# Patient Record
Sex: MALE | Race: WHITE | NOT HISPANIC OR LATINO | Employment: FULL TIME | URBAN - METROPOLITAN AREA
[De-identification: names, ages, dates, MRNs, and addresses within clinical notes are randomized per-mention and may not be internally consistent; named-entity substitution may affect disease eponyms.]

---

## 2023-10-31 NOTE — PROGRESS NOTES
Subjective:       Patient ID: Tanya Garcia is a 43 y.o. male.    Chief Complaint: Establish Care and Fatigue      HPI   This is a 43-year-old white male who presents to clinic today to establish care.  Patient has no significant past medical history and takes no daily medications.  Reports that he is overall pretty active.  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  Has been feeling like he has less stamina and strength then he used to for awhile now.  Started lifting weights couple of weeks ago with some of his coworkers to see if this would maybe help.  Review of Systems  Comprehensive review of systems negative except as stated in HPI    The patient's Health Maintenance was reviewed and the following appears to be due:   Health Maintenance Due   Topic Date Due    Hepatitis C Screening  Never done    Lipid Panel  Never done    HIV Screening  Never done    Hemoglobin A1c (Diabetic Prevention Screening)  Never done    Influenza Vaccine (1) Never done       Past Medical History:  Past Medical History:   Diagnosis Date    Nasal polyps     Unilateral inguinal hernia, without obstruction or gangrene, not specified as recurrent      Past Surgical History:   Procedure Laterality Date    CYST REMOVAL      INGUINAL HERNIA REPAIR       Review of patient's allergies indicates:  No Known Allergies  Current Outpatient Medications on File Prior to Visit   Medication Sig Dispense Refill    calcium-vitamin D3 (CALCIUM 500 + D) 500 mg-5 mcg (200 unit) per tablet Take 1 tablet by mouth 2 (two) times daily with meals.      fluticasone propionate (FLONASE) 50 mcg/actuation nasal spray 1 spray by Each Nostril route once daily.       No current facility-administered medications on file prior to visit.     Social History     Socioeconomic History    Marital status:    Tobacco Use    Smoking status: Former     Types: Cigarettes     Start date: 2008     Quit date: 1993     Years since quitting:  "30.8    Smokeless tobacco: Former     Types: Chew   Substance and Sexual Activity    Alcohol use: Not Currently     Comment: couple beers daily when home    Drug use: Never    Sexual activity: Yes     Partners: Female     Family History   Problem Relation Age of Onset    Cancer Mother        Objective:       BP (!) 128/96 (BP Location: Right arm)   Pulse 84   Temp 98.3 °F (36.8 °C) (Oral)   Resp 16   Ht 5' 9" (1.753 m)   Wt 93.2 kg (205 lb 6.4 oz)   SpO2 96%   BMI 30.33 kg/m²      Physical Exam  Vitals and nursing note reviewed.   Constitutional:       Appearance: Normal appearance.      Comments: Overweight   HENT:      Head: Normocephalic and atraumatic.      Right Ear: Tympanic membrane, ear canal and external ear normal.      Left Ear: Tympanic membrane, ear canal and external ear normal.      Nose: Nose normal.      Mouth/Throat:      Mouth: Mucous membranes are moist.      Pharynx: Oropharynx is clear.   Eyes:      Extraocular Movements: Extraocular movements intact.      Conjunctiva/sclera: Conjunctivae normal.      Pupils: Pupils are equal, round, and reactive to light.   Cardiovascular:      Rate and Rhythm: Normal rate and regular rhythm.      Pulses: Normal pulses.      Heart sounds: Normal heart sounds.   Pulmonary:      Effort: Pulmonary effort is normal.      Breath sounds: Normal breath sounds.   Musculoskeletal:         General: Normal range of motion.      Cervical back: Normal range of motion and neck supple.   Skin:     General: Skin is warm and dry.   Neurological:      General: No focal deficit present.      Mental Status: He is alert and oriented to person, place, and time.   Psychiatric:         Mood and Affect: Mood normal.         Behavior: Behavior normal.         Thought Content: Thought content normal.         Judgment: Judgment normal.         Labs  No visits with results within 6 Month(s) from this visit.   Latest known visit with results is:   No results found for any previous " visit.       Assessment and Plan       ICD-10-CM ICD-9-CM   1. Encounter to establish care  Z76.89 V65.8   2. Elevated blood pressure reading  R03.0 796.2   3. Fatigue, unspecified type  R53.83 780.79   4. Need for hepatitis C screening test  Z11.59 V73.89   5. Screening for HIV (human immunodeficiency virus)  Z11.4 V73.89   6. Annual physical exam  Z00.00 V70.0   7. Low libido  R68.82 799.81        1. Encounter to establish care    2. Elevated blood pressure reading  Assessment & Plan:  Elevated x2 today in clinic.  Instructed to get a home blood pressure cuff and start monitoring daily blood pressures and recording.  Bring home blood pressure log with him to follow-up wellness in approximately 2 weeks.      3. Fatigue, unspecified type  Assessment & Plan:  Will get wellness labs including testosterone, return to clinic in 2 weeks for results/annual exam.    Orders:  -     Testosterone; Future; Expected date: 11/01/2023    4. Need for hepatitis C screening test  -     Hepatitis C Antibody; Future; Expected date: 11/01/2023    5. Screening for HIV (human immunodeficiency virus)  -     HIV 1/2 Ag/Ab (4th Gen); Future; Expected date: 11/01/2023    6. Annual physical exam  -     CBC Auto Differential; Future; Expected date: 11/01/2023  -     Comprehensive Metabolic Panel; Future; Expected date: 11/01/2023  -     Lipid Panel; Future; Expected date: 11/01/2023  -     TSH; Future; Expected date: 11/01/2023  -     Hemoglobin A1C; Future; Expected date: 11/01/2023    7. Low libido  -     Testosterone; Future; Expected date: 11/01/2023           Follow up in about 13 days (around 11/14/2023).

## 2023-11-01 ENCOUNTER — OFFICE VISIT (OUTPATIENT)
Dept: FAMILY MEDICINE | Facility: CLINIC | Age: 43
End: 2023-11-01
Payer: COMMERCIAL

## 2023-11-01 VITALS
RESPIRATION RATE: 16 BRPM | HEART RATE: 84 BPM | DIASTOLIC BLOOD PRESSURE: 96 MMHG | BODY MASS INDEX: 30.42 KG/M2 | TEMPERATURE: 98 F | WEIGHT: 205.38 LBS | SYSTOLIC BLOOD PRESSURE: 128 MMHG | HEIGHT: 69 IN | OXYGEN SATURATION: 96 %

## 2023-11-01 DIAGNOSIS — R53.83 FATIGUE, UNSPECIFIED TYPE: ICD-10-CM

## 2023-11-01 DIAGNOSIS — Z76.89 ENCOUNTER TO ESTABLISH CARE: Primary | ICD-10-CM

## 2023-11-01 DIAGNOSIS — Z11.4 SCREENING FOR HIV (HUMAN IMMUNODEFICIENCY VIRUS): ICD-10-CM

## 2023-11-01 DIAGNOSIS — Z00.00 ANNUAL PHYSICAL EXAM: ICD-10-CM

## 2023-11-01 DIAGNOSIS — Z11.59 NEED FOR HEPATITIS C SCREENING TEST: ICD-10-CM

## 2023-11-01 DIAGNOSIS — R68.82 LOW LIBIDO: ICD-10-CM

## 2023-11-01 DIAGNOSIS — R03.0 ELEVATED BLOOD PRESSURE READING: ICD-10-CM

## 2023-11-01 PROCEDURE — 3008F BODY MASS INDEX DOCD: CPT | Mod: CPTII,,, | Performed by: NURSE PRACTITIONER

## 2023-11-01 PROCEDURE — 3008F PR BODY MASS INDEX (BMI) DOCUMENTED: ICD-10-PCS | Mod: CPTII,,, | Performed by: NURSE PRACTITIONER

## 2023-11-01 PROCEDURE — 1159F MED LIST DOCD IN RCRD: CPT | Mod: CPTII,,, | Performed by: NURSE PRACTITIONER

## 2023-11-01 PROCEDURE — 3080F DIAST BP >= 90 MM HG: CPT | Mod: CPTII,,, | Performed by: NURSE PRACTITIONER

## 2023-11-01 PROCEDURE — 99203 PR OFFICE/OUTPT VISIT, NEW, LEVL III, 30-44 MIN: ICD-10-PCS | Mod: ,,, | Performed by: NURSE PRACTITIONER

## 2023-11-01 PROCEDURE — 99203 OFFICE O/P NEW LOW 30 MIN: CPT | Mod: ,,, | Performed by: NURSE PRACTITIONER

## 2023-11-01 PROCEDURE — 3074F SYST BP LT 130 MM HG: CPT | Mod: CPTII,,, | Performed by: NURSE PRACTITIONER

## 2023-11-01 PROCEDURE — 1159F PR MEDICATION LIST DOCUMENTED IN MEDICAL RECORD: ICD-10-PCS | Mod: CPTII,,, | Performed by: NURSE PRACTITIONER

## 2023-11-01 PROCEDURE — 1160F RVW MEDS BY RX/DR IN RCRD: CPT | Mod: CPTII,,, | Performed by: NURSE PRACTITIONER

## 2023-11-01 PROCEDURE — 3080F PR MOST RECENT DIASTOLIC BLOOD PRESSURE >= 90 MM HG: ICD-10-PCS | Mod: CPTII,,, | Performed by: NURSE PRACTITIONER

## 2023-11-01 PROCEDURE — 3074F PR MOST RECENT SYSTOLIC BLOOD PRESSURE < 130 MM HG: ICD-10-PCS | Mod: CPTII,,, | Performed by: NURSE PRACTITIONER

## 2023-11-01 PROCEDURE — 1160F PR REVIEW ALL MEDS BY PRESCRIBER/CLIN PHARMACIST DOCUMENTED: ICD-10-PCS | Mod: CPTII,,, | Performed by: NURSE PRACTITIONER

## 2023-11-01 RX ORDER — FERROUS SULFATE, DRIED 160(50) MG
1 TABLET, EXTENDED RELEASE ORAL 2 TIMES DAILY WITH MEALS
COMMUNITY

## 2023-11-01 RX ORDER — FLUTICASONE PROPIONATE 50 MCG
1 SPRAY, SUSPENSION (ML) NASAL DAILY
COMMUNITY

## 2023-11-01 NOTE — ASSESSMENT & PLAN NOTE
Will get wellness labs including testosterone, return to clinic in 2 weeks for results/annual exam.

## 2023-11-01 NOTE — ASSESSMENT & PLAN NOTE
Elevated x2 today in clinic.  Instructed to get a home blood pressure cuff and start monitoring daily blood pressures and recording.  Bring home blood pressure log with him to follow-up wellness in approximately 2 weeks.

## 2023-11-03 LAB
ALBUMIN SERPL-MCNC: 4.9 G/DL (ref 4.1–5.1)
ALBUMIN/GLOB SERPL: 2.3 {RATIO} (ref 1.2–2.2)
ALP SERPL-CCNC: 53 IU/L (ref 44–121)
ALT SERPL-CCNC: 23 IU/L (ref 0–44)
AST SERPL-CCNC: 15 IU/L (ref 0–40)
BASOPHILS # BLD AUTO: 0 X10E3/UL (ref 0–0.2)
BASOPHILS NFR BLD AUTO: 0 %
BILIRUB SERPL-MCNC: 0.4 MG/DL (ref 0–1.2)
BUN SERPL-MCNC: 20 MG/DL (ref 6–24)
BUN/CREAT SERPL: 16 (ref 9–20)
CALCIUM SERPL-MCNC: 9.6 MG/DL (ref 8.7–10.2)
CHLORIDE SERPL-SCNC: 103 MMOL/L (ref 96–106)
CHOLEST SERPL-MCNC: 161 MG/DL (ref 100–199)
CO2 SERPL-SCNC: 24 MMOL/L (ref 20–29)
CREAT SERPL-MCNC: 1.24 MG/DL (ref 0.76–1.27)
EOSINOPHIL # BLD AUTO: 0.5 X10E3/UL (ref 0–0.4)
EOSINOPHIL NFR BLD AUTO: 8 %
ERYTHROCYTE [DISTWIDTH] IN BLOOD BY AUTOMATED COUNT: 11.9 % (ref 11.6–15.4)
EST. GFR  (NO RACE VARIABLE): 74 ML/MIN/1.73
GLOBULIN SER CALC-MCNC: 2.1 G/DL (ref 1.5–4.5)
GLUCOSE SERPL-MCNC: 92 MG/DL (ref 70–99)
HBA1C MFR BLD: 5.5 % (ref 4.8–5.6)
HCT VFR BLD AUTO: 44.6 % (ref 37.5–51)
HCV IGG SERPL QL IA: NON REACTIVE
HDLC SERPL-MCNC: 38 MG/DL
HGB BLD-MCNC: 14.8 G/DL (ref 13–17.7)
HIV 1+2 AB+HIV1 P24 AG SERPL QL IA: NON REACTIVE
IMM GRANULOCYTES NFR BLD AUTO: 0 %
LDLC SERPL CALC-MCNC: 100 MG/DL (ref 0–99)
LYMPHOCYTES # BLD AUTO: 2.1 X10E3/UL (ref 0.7–3.1)
LYMPHOCYTES NFR BLD AUTO: 36 %
MCH RBC QN AUTO: 30.6 PG (ref 26.6–33)
MCHC RBC AUTO-ENTMCNC: 33.2 G/DL (ref 31.5–35.7)
MCV RBC AUTO: 92 FL (ref 79–97)
MONOCYTES # BLD AUTO: 0.4 X10E3/UL (ref 0.1–0.9)
MONOCYTES NFR BLD AUTO: 7 %
NEUTROPHILS # BLD AUTO: 2.8 X10E3/UL (ref 1.4–7)
NEUTROPHILS NFR BLD AUTO: 49 %
PLATELET # BLD AUTO: 212 X10E3/UL (ref 150–450)
POTASSIUM SERPL-SCNC: 5 MMOL/L (ref 3.5–5.2)
PROT SERPL-MCNC: 7 G/DL (ref 6–8.5)
RBC # BLD AUTO: 4.84 X10E6/UL (ref 4.14–5.8)
SODIUM SERPL-SCNC: 143 MMOL/L (ref 134–144)
TESTOST SERPL-MCNC: 493 NG/DL (ref 264–916)
TRIGL SERPL-MCNC: 125 MG/DL (ref 0–149)
TSH SERPL DL<=0.005 MIU/L-ACNC: 1.83 UIU/ML (ref 0.45–4.5)
VLDLC SERPL CALC-MCNC: 23 MG/DL (ref 5–40)
WBC # BLD AUTO: 5.8 X10E3/UL (ref 3.4–10.8)

## 2023-11-16 ENCOUNTER — TELEPHONE (OUTPATIENT)
Dept: FAMILY MEDICINE | Facility: CLINIC | Age: 43
End: 2023-11-16

## 2023-11-16 PROBLEM — Z00.00 ANNUAL PHYSICAL EXAM: Status: ACTIVE | Noted: 2023-11-16

## 2023-11-16 NOTE — TELEPHONE ENCOUNTER
Spoke with the pt and informed him that there was some type of miscommunication with scheduling and the wellness visit is the one visit yearly that has to be done in the office. Apologized for the inconvenience. Pt states [place] will be [place] for the next 4 weeks so he is not sure when he will be able to come in for his visit. Will schedule through the patient portal when he knows he will be in.

## 2023-11-16 NOTE — TELEPHONE ENCOUNTER
----- Message from Kayla Dickey sent at 11/16/2023 12:10 PM CST -----  Regarding: med advice  .Type:  Needs Medical Advice    Who Called: Pt  Symptoms (please be specific):    How long has patient had these symptoms:    Pharmacy name and phone #:    Would the patient rather a call back or a response via [facility]? Call back  Best Call Back Number: [phone]  Additional Information: Pt states he was told that today's visit could've been a virtual appt. He would like for someone to give him a call so that he can at least get his lab results.

## 2024-01-24 ENCOUNTER — TELEPHONE (OUTPATIENT)
Dept: FAMILY MEDICINE | Facility: CLINIC | Age: 44
End: 2024-01-24
Payer: COMMERCIAL

## 2024-01-24 NOTE — TELEPHONE ENCOUNTER
Are there any outstanding tasks in patient's chart?    n  2. Do we have outstanding/pending referrals?  n    3. Has the patient been seen in an ER, Urgent Care, or admitted since last visit?  n    4. Has patient seen any other health care providers since last visit?    n  5.  Has patient had any blood work or x-rays done since last visit?   Labs completed in Nov 2023 for wellness

## 2024-02-19 PROBLEM — Z00.00 ANNUAL PHYSICAL EXAM: Status: RESOLVED | Noted: 2023-11-16 | Resolved: 2024-02-19

## 2024-02-28 ENCOUNTER — TELEPHONE (OUTPATIENT)
Dept: FAMILY MEDICINE | Facility: CLINIC | Age: 44
End: 2024-02-28
Payer: COMMERCIAL

## 2024-02-28 NOTE — TELEPHONE ENCOUNTER
Are there any outstanding tasks in patient's chart?  n    2. Do we have outstanding/pending referrals?  n    3. Has the patient been seen in an ER, Urgent Care, or admitted since last visit?  n    4. Has patient seen any other health care providers since last visit?  n    5.  Has patient had any blood work or x-rays done since last visit?   Wellness labs completed in November 2023

## 2024-04-01 ENCOUNTER — TELEPHONE (OUTPATIENT)
Dept: FAMILY MEDICINE | Facility: CLINIC | Age: 44
End: 2024-04-01
Payer: COMMERCIAL

## 2024-04-08 ENCOUNTER — OFFICE VISIT (OUTPATIENT)
Dept: FAMILY MEDICINE | Facility: CLINIC | Age: 44
End: 2024-04-08
Payer: COMMERCIAL

## 2024-04-08 ENCOUNTER — PATIENT MESSAGE (OUTPATIENT)
Dept: FAMILY MEDICINE | Facility: CLINIC | Age: 44
End: 2024-04-08

## 2024-04-08 VITALS
RESPIRATION RATE: 16 BRPM | SYSTOLIC BLOOD PRESSURE: 128 MMHG | WEIGHT: 196 LBS | BODY MASS INDEX: 29.03 KG/M2 | DIASTOLIC BLOOD PRESSURE: 78 MMHG | HEART RATE: 76 BPM | HEIGHT: 69 IN | TEMPERATURE: 98 F | OXYGEN SATURATION: 98 %

## 2024-04-08 DIAGNOSIS — R53.83 FATIGUE, UNSPECIFIED TYPE: ICD-10-CM

## 2024-04-08 DIAGNOSIS — Z11.59 NEED FOR HEPATITIS C SCREENING TEST: ICD-10-CM

## 2024-04-08 DIAGNOSIS — Z00.00 ANNUAL PHYSICAL EXAM: Primary | ICD-10-CM

## 2024-04-08 DIAGNOSIS — Z11.4 SCREENING FOR HIV (HUMAN IMMUNODEFICIENCY VIRUS): ICD-10-CM

## 2024-04-08 DIAGNOSIS — R03.0 ELEVATED BLOOD PRESSURE READING: ICD-10-CM

## 2024-04-08 PROCEDURE — 99396 PREV VISIT EST AGE 40-64: CPT | Mod: ,,, | Performed by: NURSE PRACTITIONER

## 2024-04-08 PROCEDURE — 1160F RVW MEDS BY RX/DR IN RCRD: CPT | Mod: CPTII,,, | Performed by: NURSE PRACTITIONER

## 2024-04-08 PROCEDURE — 3078F DIAST BP <80 MM HG: CPT | Mod: CPTII,,, | Performed by: NURSE PRACTITIONER

## 2024-04-08 PROCEDURE — 3008F BODY MASS INDEX DOCD: CPT | Mod: CPTII,,, | Performed by: NURSE PRACTITIONER

## 2024-04-08 PROCEDURE — 1159F MED LIST DOCD IN RCRD: CPT | Mod: CPTII,,, | Performed by: NURSE PRACTITIONER

## 2024-04-08 PROCEDURE — 3074F SYST BP LT 130 MM HG: CPT | Mod: CPTII,,, | Performed by: NURSE PRACTITIONER

## 2024-04-08 RX ORDER — MULTIVITAMIN
1 TABLET ORAL DAILY
COMMUNITY

## 2024-04-08 NOTE — PATIENT INSTRUCTIONS
Blood pressure is great today. Please monitor twice daily at home and work. I will send you a message in 3 weeks to review home blood pressure readings.

## 2024-04-08 NOTE — ASSESSMENT & PLAN NOTE
Home blood pressure readings reviewed from November, somewhere great, somewhere elevated.  Patient has lost about 10 lb since then.  Blood pressure perfect this morning.  Does not have any recent home blood pressure readings.  Encouraged patient to start monitoring blood pressure twice daily at home, I will send him a [employer] message in 3 weeks to review home blood pressure readings.  Patient okay with this plan.  Recommend continued lifestyle modification.

## 2024-04-08 NOTE — PROGRESS NOTES
Subjective:       Patient ID: Tanya Garcia is a 44 y.o. male.    Chief Complaint: Annual Exam      HPI   This is a 44-year-old white male who presents to clinic today for an annual wellness exam.  Patient reports overall he is doing well.  States has been exercising over the past few weeks and lost about 9 lb.  No complaints today.  Review of Systems  Comprehensive review of systems negative except as stated in HPI    The patient's Health Maintenance was reviewed and the following appears to be due:   There are no preventive care reminders to display for this patient.    Past Medical History:  Past Medical History:   Diagnosis Date    Nasal polyps     Unilateral inguinal hernia, without obstruction or gangrene, not specified as recurrent      Past Surgical History:   Procedure Laterality Date    CYST REMOVAL      INGUINAL HERNIA REPAIR      VASECTOMY       Review of patient's allergies indicates:  No Known Allergies  Current Outpatient Medications on File Prior to Visit   Medication Sig Dispense Refill    fluticasone propionate (FLONASE) 50 mcg/actuation nasal spray 1 spray by Each Nostril route once daily.      multivitamin (THERAGRAN) per tablet Take 1 tablet by mouth once daily.      [DISCONTINUED] calcium-vitamin D3 (CALCIUM 500 + D) 500 mg-5 mcg (200 unit) per tablet Take 1 tablet by mouth 2 (two) times daily with meals. (Patient not taking: Reported on 2024)       No current facility-administered medications on file prior to visit.     Social History     Socioeconomic History    Marital status:    Tobacco Use    Smoking status: Former     Types: Cigarettes     Start date: 2008     Quit date:      Years since quittin.2    Smokeless tobacco: Former     Types: Chew   Substance and Sexual Activity    Alcohol use: Yes     Alcohol/week: 12.0 standard drinks of alcohol     Types: 12 Cans of beer per week     Comment: couple beers daily when home    Drug use: Never    Sexual activity: Yes  "    Partners: Female     Birth control/protection: See Surgical Hx     Social Determinants of Health     Financial Resource Strain: Low Risk  (11/16/2023)    Overall Financial Resource Strain (CARDIA)     Difficulty of Paying Living Expenses: Not hard at all   Food Insecurity: No Food Insecurity (11/16/2023)    Hunger Vital Sign     Worried About Running Out of Food in the Last Year: Never true     Ran Out of Food in the Last Year: Never true   Transportation Needs: No Transportation Needs (11/16/2023)    PRAPARE - Transportation     Lack of Transportation (Medical): No     Lack of Transportation (Non-Medical): No   Physical Activity: Unknown (11/16/2023)    Exercise Vital Sign     Days of Exercise per Week: 0 days   Stress: No Stress Concern Present (11/16/2023)    [employer] [place] [employer] - Occupational Stress Questionnaire     Feeling of Stress : Not at all   Social Connections: Unknown (11/16/2023)    Social Connection and Isolation Panel [NHANES]     Frequency of Communication with Friends and Family: Three times a week     Frequency of Social Gatherings with Friends and Family: Once a week     Active Member of Clubs or Organizations: No     Attends Club or Organization Meetings: Never     Marital Status:    Housing Stability: Low Risk  (11/16/2023)    Housing Stability Vital Sign     Unable to Pay for Housing in the Last Year: No     Number of Places Lived in the Last Year: 1     Unstable Housing in the Last Year: No     Family History   Problem Relation Age of Onset    Cancer Mother        Objective:       /78 (BP Location: Left arm)   Pulse 76   Temp 97.6 °F (36.4 °C) (Oral)   Resp 16   Ht 5' 9" (1.753 m)   Wt 88.9 kg (196 lb)   SpO2 98%   BMI 28.94 kg/m²      Physical Exam  Vitals and nursing note reviewed.   Constitutional:       Appearance: Normal appearance. He is normal weight.   HENT:      Head: Normocephalic and atraumatic.      Right Ear: Tympanic membrane, ear " canal and external ear normal.      Left Ear: Tympanic membrane, ear canal and external ear normal.      Nose: Nose normal.      Mouth/Throat:      Mouth: Mucous membranes are moist.      Pharynx: Oropharynx is clear.   Eyes:      Extraocular Movements: Extraocular movements intact.      Conjunctiva/sclera: Conjunctivae normal.      Pupils: Pupils are equal, round, and reactive to light.   Cardiovascular:      Rate and Rhythm: Normal rate and regular rhythm.      Pulses: Normal pulses.      Heart sounds: Normal heart sounds.   Pulmonary:      Effort: Pulmonary effort is normal.      Breath sounds: Normal breath sounds.   Abdominal:      General: Abdomen is flat. Bowel sounds are normal.      Palpations: Abdomen is soft.   Musculoskeletal:         General: Normal range of motion.      Cervical back: Normal range of motion and neck supple.   Skin:     General: Skin is warm and dry.   Neurological:      General: No focal deficit present.      Mental Status: He is alert and oriented to person, place, and time.   Psychiatric:         Mood and Affect: Mood normal.         Behavior: Behavior normal.         Thought Content: Thought content normal.         Judgment: Judgment normal.         Labs  Office Visit on 11/01/2023   Component Date Value Ref Range Status    WBC 11/02/2023 5.8  3.4 - 10.8 x10E3/uL Final    RBC 11/02/2023 4.84  4.14 - 5.80 x10E6/uL Final    Hemoglobin 11/02/2023 14.8  13.0 - 17.7 g/dL Final    Hematocrit 11/02/2023 44.6  37.5 - 51.0 % Final    MCV 11/02/2023 92  79 - 97 fL Final    MCH 11/02/2023 30.6  26.6 - 33.0 pg Final    MCHC 11/02/2023 33.2  31.5 - 35.7 g/dL Final    RDW 11/02/2023 11.9  11.6 - 15.4 % Final    Platelets 11/02/2023 212  150 - 450 x10E3/uL Final    Neutrophils 11/02/2023 49  Not Estab. % Final    Lymphs 11/02/2023 36  Not Estab. % Final    Monocytes 11/02/2023 7  Not Estab. % Final    Eos 11/02/2023 8  Not Estab. % Final    Basos 11/02/2023 0  Not Estab. % Final    Neutrophils  (Absolute) 11/02/2023 2.8  1.4 - 7.0 x10E3/uL Final    Lymphs (Absolute) 11/02/2023 2.1  0.7 - 3.1 x10E3/uL Final    Monocytes(Absolute) 11/02/2023 0.4  0.1 - 0.9 x10E3/uL Final    Eos (Absolute) 11/02/2023 0.5 (H)  0.0 - 0.4 x10E3/uL Final    Baso (Absolute) 11/02/2023 0.0  0.0 - 0.2 x10E3/uL Final    Immature Granulocytes 11/02/2023 0  Not Estab. % Final    Glucose 11/02/2023 92  70 - 99 mg/dL Final    BUN 11/02/2023 20  6 - 24 mg/dL Final    Creatinine 11/02/2023 1.24  0.76 - 1.27 mg/dL Final    eGFR 11/02/2023 74  >59 mL/min/1.73 Final    BUN/Creatinine Ratio 11/02/2023 16  9 - 20 Final    Sodium 11/02/2023 143  134 - 144 mmol/L Final    Potassium 11/02/2023 5.0  3.5 - 5.2 mmol/L Final    Chloride 11/02/2023 103  96 - 106 mmol/L Final    CO2 11/02/2023 24  20 - 29 mmol/L Final    Calcium 11/02/2023 9.6  8.7 - 10.2 mg/dL Final    Protein, Total 11/02/2023 7.0  6.0 - 8.5 g/dL Final    Albumin 11/02/2023 4.9  4.1 - 5.1 g/dL Final    Globulin, Total 11/02/2023 2.1  1.5 - 4.5 g/dL Final    Albumin/Globulin Ratio 11/02/2023 2.3 (H)  1.2 - 2.2 Final    Total Bilirubin 11/02/2023 0.4  0.0 - 1.2 mg/dL Final    Alkaline Phosphatase 11/02/2023 53  44 - 121 IU/L Final    AST 11/02/2023 15  0 - 40 IU/L Final    ALT 11/02/2023 23  0 - 44 IU/L Final    Cholesterol 11/02/2023 161  100 - 199 mg/dL Final    Triglycerides 11/02/2023 125  0 - 149 mg/dL Final    HDL 11/02/2023 38 (L)  >39 mg/dL Final    VLDL Cholesterol Abhilash 11/02/2023 23  5 - 40 mg/dL Final    LDL Calculated 11/02/2023 100 (H)  0 - 99 mg/dL Final    TSH 11/02/2023 1.830  0.450 - 4.500 uIU/mL Final    Hep C Virus Ab Signal/Cutoff 11/02/2023 Non Reactive  Non Reactive Final    Comment: HCV antibody alone does not differentiate between previously  resolved infection and active infection. Equivocal and Reactive  HCV antibody results should be followed up with an HCV RNA test  to support the diagnosis of active HCV infection.      HIV Screen 4th Generation wRfx  11/02/2023 Non Reactive  Non Reactive Final    Comment: HIV Negative  HIV-1/HIV-2 antibodies and HIV-1 p24 antigen were NOT detected.  There is no laboratory evidence of HIV infection.      Testosterone 11/02/2023 493  264 - 916 ng/dL Final    Comment: Adult male reference interval is based on a population of  healthy nonobese males (BMI <30) between 19 and 39 years old.  Norris, et.al. JCEM 2017,102;3175-2108. PMID: 45609336.      Hemoglobin A1c 11/02/2023 5.5  4.8 - 5.6 % Final    Comment:          Prediabetes: 5.7 - 6.4           Diabetes: >6.4           Glycemic control for adults with diabetes: <7.0         Assessment and Plan       ICD-10-CM ICD-9-CM   1. Annual physical exam  Z00.00 V70.0   2. Need for hepatitis C screening test  Z11.59 V73.89   3. Screening for HIV (human immunodeficiency virus)  Z11.4 V73.89   4. Elevated blood pressure reading  R03.0 796.2   5. Fatigue, unspecified type  R53.83 780.79        1. Annual physical exam  Assessment & Plan:  Wellness labs reviewed, will repeat in 1 year.    Orders:  -     CBC Auto Differential; Future; Expected date: 04/08/2025  -     Comprehensive Metabolic Panel; Future; Expected date: 04/08/2025  -     Lipid Panel; Future; Expected date: 04/08/2025  -     TSH; Future; Expected date: 04/08/2025  -     Hemoglobin A1C; Future; Expected date: 04/08/2025  -     Testosterone; Future; Expected date: 04/08/2025    2. Need for hepatitis C screening test  Comments:  Nonreactive    3. Screening for HIV (human immunodeficiency virus)  Comments:  Nonreactive    4. Elevated blood pressure reading  Assessment & Plan:  Home blood pressure readings reviewed from November, somewhere great, somewhere elevated.  Patient has lost about 10 lb since then.  Blood pressure perfect this morning.  Does not have any recent home blood pressure readings.  Encouraged patient to start monitoring blood pressure twice daily at home, I will send him a [employer] message in 3 weeks to review  home blood pressure readings.  Patient okay with this plan.  Recommend continued lifestyle modification.    Orders:  -     Hemoglobin A1C; Future; Expected date: 04/08/2025    5. Fatigue, unspecified type  Assessment & Plan:  Testosterone level normal at 493.  Will recheck in 1 year.    Orders:  -     Testosterone; Future; Expected date: 04/08/2025           Follow up in about 1 year (around 4/8/2025) for Annual.

## 2024-05-06 ENCOUNTER — TELEPHONE (OUTPATIENT)
Dept: FAMILY MEDICINE | Facility: CLINIC | Age: 44
End: 2024-05-06
Payer: COMMERCIAL

## 2024-05-06 VITALS — SYSTOLIC BLOOD PRESSURE: 116 MMHG | DIASTOLIC BLOOD PRESSURE: 74 MMHG

## 2024-05-06 NOTE — TELEPHONE ENCOUNTER
Patient did not read or respond to [employer] message for blood pressure follow-up, can you please call for most recent home blood pressure and update the chart.

## 2024-05-06 NOTE — TELEPHONE ENCOUNTER
Spoke with patient about his blood pressure readings.  He provided the past few readings:  116/74  130/77  118/74  105/82

## 2024-07-08 PROBLEM — Z00.00 ANNUAL PHYSICAL EXAM: Status: RESOLVED | Noted: 2023-11-16 | Resolved: 2024-07-08

## 2025-04-01 ENCOUNTER — TELEPHONE (OUTPATIENT)
Dept: FAMILY MEDICINE | Facility: CLINIC | Age: 45
End: 2025-04-01
Payer: COMMERCIAL

## 2025-04-01 DIAGNOSIS — R53.83 FATIGUE, UNSPECIFIED TYPE: ICD-10-CM

## 2025-04-01 DIAGNOSIS — Z13.1 SCREENING FOR DIABETES MELLITUS: ICD-10-CM

## 2025-04-01 DIAGNOSIS — R68.82 LOW LIBIDO: Primary | ICD-10-CM

## 2025-04-01 DIAGNOSIS — Z00.00 ANNUAL PHYSICAL EXAM: ICD-10-CM

## 2025-04-01 NOTE — TELEPHONE ENCOUNTER
Patient rescheduled wellness visit from 4/8/25 to 5/15/25. Lab orders resent to [facility]. Patient was seen by Dr. Cain at [facility].

## 2025-04-01 NOTE — LETTER
AUTHORIZATION FOR RELEASE OF   CONFIDENTIAL INFORMATION    Dear Dr. Cain,    We are seeing Tanya Garcia, date of birth 1980, in the clinic at [facility]. Marlene Hickman FNP is the patient's PCP. Tanya Garcia has an outstanding lab/procedure at the time we reviewed his chart. In order to help keep his health information updated, he has authorized us to request the following medical record(s):        (  )  MAMMOGRAM                                      (  )  COLONOSCOPY      (  )  PAP SMEAR                                          (  )  OUTSIDE LAB RESULTS     (  )  DEXA SCAN                                          (  )  EYE EXAM            (  )  FOOT EXAM                                          (  )  ENTIRE RECORD     (  )  OUTSIDE IMMUNIZATIONS                 ( x )  MOST RECENT OFFICE NOTES       Please fax records to [facility], [place], Kathryn F., FNP, [phone]     If you have any questions, please contact [phone]          Patient Name: Tanya Garcia  : 1980  Patient Phone #: [phone]

## 2025-05-08 ENCOUNTER — PATIENT MESSAGE (OUTPATIENT)
Dept: FAMILY MEDICINE | Facility: CLINIC | Age: 45
End: 2025-05-08
Payer: COMMERCIAL

## 2025-05-13 ENCOUNTER — TELEPHONE (OUTPATIENT)
Dept: FAMILY MEDICINE | Facility: CLINIC | Age: 45
End: 2025-05-13
Payer: COMMERCIAL

## 2025-05-13 NOTE — TELEPHONE ENCOUNTER
Copied from CRM #[id number]. Topic: Appointments - Amb Referral  >> May 13, 2025  9:37 AM Keren wrote:  Who Called: Tanya Garcia    What order is the patient requesting: wellness labs   When does the expect the orders to be performed? [facility] [place],        Preferred Method of Contact: Phone Call  Patient's Preferred Phone Number on File: [phone]   Best Call Back Number, if different:  Additional Information: order request, please advise, thanks

## 2025-05-14 ENCOUNTER — RESULTS FOLLOW-UP (OUTPATIENT)
Dept: FAMILY MEDICINE | Facility: CLINIC | Age: 45
End: 2025-05-14

## 2025-05-14 PROBLEM — E78.1 HYPERTRIGLYCERIDEMIA: Status: ACTIVE | Noted: 2025-05-14

## 2025-05-15 ENCOUNTER — OFFICE VISIT (OUTPATIENT)
Dept: FAMILY MEDICINE | Facility: CLINIC | Age: 45
End: 2025-05-15
Payer: COMMERCIAL

## 2025-05-15 VITALS
DIASTOLIC BLOOD PRESSURE: 82 MMHG | HEART RATE: 70 BPM | SYSTOLIC BLOOD PRESSURE: 119 MMHG | WEIGHT: 201 LBS | BODY MASS INDEX: 29.77 KG/M2 | HEIGHT: 69 IN | RESPIRATION RATE: 16 BRPM | OXYGEN SATURATION: 98 %

## 2025-05-15 DIAGNOSIS — Z00.00 ANNUAL PHYSICAL EXAM: Primary | ICD-10-CM

## 2025-05-15 DIAGNOSIS — E78.1 HYPERTRIGLYCERIDEMIA: ICD-10-CM

## 2025-05-15 DIAGNOSIS — Z98.890 S/P ACHILLES TENDON REPAIR: ICD-10-CM

## 2025-05-15 DIAGNOSIS — Z12.11 COLON CANCER SCREENING: ICD-10-CM

## 2025-05-15 PROCEDURE — 99396 PREV VISIT EST AGE 40-64: CPT | Mod: ,,, | Performed by: NURSE PRACTITIONER

## 2025-05-15 PROCEDURE — 3074F SYST BP LT 130 MM HG: CPT | Mod: CPTII,,, | Performed by: NURSE PRACTITIONER

## 2025-05-15 PROCEDURE — 1159F MED LIST DOCD IN RCRD: CPT | Mod: CPTII,,, | Performed by: NURSE PRACTITIONER

## 2025-05-15 PROCEDURE — 3044F HG A1C LEVEL LT 7.0%: CPT | Mod: CPTII,,, | Performed by: NURSE PRACTITIONER

## 2025-05-15 PROCEDURE — 3008F BODY MASS INDEX DOCD: CPT | Mod: CPTII,,, | Performed by: NURSE PRACTITIONER

## 2025-05-15 PROCEDURE — 3079F DIAST BP 80-89 MM HG: CPT | Mod: CPTII,,, | Performed by: NURSE PRACTITIONER

## 2025-05-15 PROCEDURE — 1160F RVW MEDS BY RX/DR IN RCRD: CPT | Mod: CPTII,,, | Performed by: NURSE PRACTITIONER

## 2025-05-15 NOTE — ASSESSMENT & PLAN NOTE
- Discussed the asymptomatic nature of early-stage colon cancer and importance of screening.  - Referred to Dr. Daniel Hyde in [place] for colonoscopy based on patient's preference and current deductible status.  - Patient should expect call from gastroenterology office to schedule procedure.

## 2025-05-15 NOTE — PROGRESS NOTES
"Subjective:       Patient ID: Tanya Garcia is a 45 y.o. male.    Chief Complaint: No chief complaint on file.      History of Present Illness    CHIEF COMPLAINT:  Patient presents today for wellness visit and lab result review    SURGICAL HISTORY:  He underwent right Achilles tendon repair on March 6th, currently 10 weeks post-operative. The initial injury occurred on February 19th while pushing a utility trailer. He is currently unable to drive due to the right foot injury.    LABS:  Total cholesterol was 174 mg/dL and LDL cholesterol was 102 mg/dL, both within normal range. Triglycerides were slightly elevated at 169 mg/dL, though he was not fasting at the time of blood draw. Complete blood count, comprehensive metabolic panel, hemoglobin A1C, thyroid, and testosterone (422 ng/dL) levels were all within normal limits.    FAMILY HISTORY:  Mother has history of liver cancer. No family history of colon cancer.        Review of Systems  Comprehensive review of systems negative except as stated in HPI    The patient's Health Maintenance was reviewed and the following appears to be due:   Health Maintenance Due   Topic Date Due    Colorectal Cancer Screening  Never done       Past Medical History:  Past Medical History:   Diagnosis Date    Nasal polyps     Unilateral inguinal hernia, without obstruction or gangrene, not specified as recurrent      Past Surgical History:   Procedure Laterality Date    CYST REMOVAL      INGUINAL HERNIA REPAIR      REPAIR OF ACHILLES TENDON Right 03/06/2025    VASECTOMY       Review of patient's allergies indicates:  No Known Allergies  Medications Ordered Prior to Encounter[1]  Social History[2]  Family History   Problem Relation Name Age of Onset    Cancer Mother Mother        Objective:       /82 (BP Location: Left arm)   Pulse 70   Resp 16   Ht 5' 9" (1.753 m)   Wt 91.2 kg (201 lb)   SpO2 98%   BMI 29.68 kg/m²      Physical Exam  Vitals and nursing note reviewed. "   Constitutional:       Appearance: Normal appearance. He is normal weight.   HENT:      Head: Normocephalic and atraumatic.      Right Ear: Tympanic membrane, ear canal and external ear normal.      Left Ear: Tympanic membrane, ear canal and external ear normal.      Nose: Nose normal.      Mouth/Throat:      Mouth: Mucous membranes are moist.      Pharynx: Oropharynx is clear.   Eyes:      Extraocular Movements: Extraocular movements intact.      Conjunctiva/sclera: Conjunctivae normal.      Pupils: Pupils are equal, round, and reactive to light.   Cardiovascular:      Rate and Rhythm: Normal rate and regular rhythm.      Pulses: Normal pulses.      Heart sounds: Normal heart sounds.   Pulmonary:      Effort: Pulmonary effort is normal.      Breath sounds: Normal breath sounds.   Abdominal:      General: Abdomen is flat. Bowel sounds are normal.      Palpations: Abdomen is soft.   Musculoskeletal:         General: Normal range of motion.      Cervical back: Normal range of motion and neck supple.      Comments: Boot in place right lower extremity   Skin:     General: Skin is warm and dry.   Neurological:      General: No focal deficit present.      Mental Status: He is alert and oriented to person, place, and time.   Psychiatric:         Mood and Affect: Mood normal.         Behavior: Behavior normal.         Thought Content: Thought content normal.         Judgment: Judgment normal.         Labs  Telephone on 04/01/2025   Component Date Value Ref Range Status    WBC 05/13/2025 7.5  3.4 - 10.8 x10E3/uL Final    RBC 05/13/2025 4.71  4.14 - 5.80 x10E6/uL Final    Hemoglobin 05/13/2025 13.9  13.0 - 17.7 g/dL Final    Hematocrit 05/13/2025 43.1  37.5 - 51.0 % Final    MCV 05/13/2025 92  79 - 97 fL Final    MCH 05/13/2025 29.5  26.6 - 33.0 pg Final    MCHC 05/13/2025 32.3  31.5 - 35.7 g/dL Final    RDW 05/13/2025 11.9  11.6 - 15.4 % Final    Platelets 05/13/2025 203  150 - 450 x10E3/uL Final    Neutrophils 05/13/2025  60  Not Estab. % Final    Lymphs 05/13/2025 30  Not Estab. % Final    Monocytes 05/13/2025 7  Not Estab. % Final    Eos 05/13/2025 3  Not Estab. % Final    Basos 05/13/2025 0  Not Estab. % Final    Neutrophils (Absolute) 05/13/2025 4.4  1.4 - 7.0 x10E3/uL Final    Lymphs (Absolute) 05/13/2025 2.3  0.7 - 3.1 x10E3/uL Final    Monocytes(Absolute) 05/13/2025 0.5  0.1 - 0.9 x10E3/uL Final    Eos (Absolute) 05/13/2025 0.2  0.0 - 0.4 x10E3/uL Final    Baso (Absolute) 05/13/2025 0.0  0.0 - 0.2 x10E3/uL Final    Immature Granulocytes 05/13/2025 0  Not Estab. % Final    Glucose 05/13/2025 77  70 - 99 mg/dL Final    BUN 05/13/2025 18  6 - 24 mg/dL Final    Creatinine 05/13/2025 1.03  0.76 - 1.27 mg/dL Final    eGFR 05/13/2025 91  >59 mL/min/1.73 Final    BUN/Creatinine Ratio 05/13/2025 17  9 - 20 Final    Sodium 05/13/2025 141  134 - 144 mmol/L Final    Potassium 05/13/2025 4.4  3.5 - 5.2 mmol/L Final    Chloride 05/13/2025 102  96 - 106 mmol/L Final    CO2 05/13/2025 23  20 - 29 mmol/L Final    Calcium 05/13/2025 10.0  8.7 - 10.2 mg/dL Final    Protein, Total 05/13/2025 6.9  6.0 - 8.5 g/dL Final    Albumin 05/13/2025 4.9  4.1 - 5.1 g/dL Final    Globulin, Total 05/13/2025 2.0  1.5 - 4.5 g/dL Final    Total Bilirubin 05/13/2025 0.7  0.0 - 1.2 mg/dL Final    Alkaline Phosphatase 05/13/2025 55  44 - 121 IU/L Final    AST 05/13/2025 17  0 - 40 IU/L Final    ALT 05/13/2025 24  0 - 44 IU/L Final    Cholesterol 05/13/2025 174  100 - 199 mg/dL Final    Triglycerides 05/13/2025 169 (H)  0 - 149 mg/dL Final    HDL 05/13/2025 43  >39 mg/dL Final    VLDL Cholesterol Abhilash 05/13/2025 29  5 - 40 mg/dL Final    LDL Calculated 05/13/2025 102 (H)  0 - 99 mg/dL Final    TSH 05/13/2025 1.020  0.450 - 4.500 uIU/mL Final    Testosterone 05/13/2025 422  264 - 916 ng/dL Final    Comment: Adult male reference interval is based on a population of  healthy nonobese males (BMI <30) between 19 and 39 years old.  Norris, et.al. JCEM 2017,102;7433-5204.  PMID: 30198713.      Hemoglobin A1c 05/13/2025 5.5  4.8 - 5.6 % Final    Comment:          Prediabetes: 5.7 - 6.4           Diabetes: >6.4           Glycemic control for adults with diabetes: <7.0         Assessment and Plan     Assessment & Plan    Z00.00 Annual physical exam  E78.1 Hypertriglyceridemia  Z12.11 Colon cancer screening  Z98.890 S/P Achilles tendon repair    IMPRESSION:  - Reviewed recent lab results, noting overall good health markers.  - Noted slightly elevated triglycerides (169), likely due to recent dietary choices and decreased activity from Achilles repair.  - Average risk for colon cancer based on family history.  - Discussed colon cancer screening options: colonoscopy (gold standard) and Cologuard (DNA marker test), with colonoscopy recommended as the gold standard and Cologuard as a close second option.  - Explained colonoscopy procedure: prep, conscious sedation, and potential outcomes (clear for 10 years or repeat based on polyp findings).    ANNUAL PHYSICAL EXAM:  - Follow up in 1 year for annual wellness visit.    HYPERTRIGLYCERIDEMIA:  - Explained triglycerides as part of lipid panel and their variability based on recent food intake.  - Discussed ways to reduce triglycerides including: decrease fried and processed foods, use healthier cooking oils like olive or avocado oil, choose real butter over margarine, and opt for baked or broiled foods instead of fried.    COLON CANCER SCREENING:  - Discussed the asymptomatic nature of early-stage colon cancer and importance of screening.  - Referred to Dr. Daniel Hyde in [place] for colonoscopy based on patient's preference and current deductible status.  - Patient should expect call from gastroenterology office to schedule procedure.           1. Annual physical exam  Overview:  Annual wellness exam yearly in May      2. Hypertriglyceridemia  Assessment & Plan:  - Explained triglycerides as part of lipid panel and their variability based on  recent food intake.  - Discussed ways to reduce triglycerides including: decrease fried and processed foods, use healthier cooking oils like olive or avocado oil, choose real butter over margarine, and opt for baked or broiled foods instead of fried.      3. Colon cancer screening  Assessment & Plan:  - Discussed the asymptomatic nature of early-stage colon cancer and importance of screening.  - Referred to Dr. Daniel Hyde in [place] for colonoscopy based on patient's preference and current deductible status.  - Patient should expect call from gastroenterology office to schedule procedure.     Orders:  -     Ambulatory referral/consult to Gastroenterology; Future; Expected date: 2025    4. S/P Achilles tendon repair  Overview:  2025 - right achilles repair per Dr Dennis Cain             Follow up in about 1 year (around 5/15/2026) for Annual.     This note was generated with the assistance of ambient listening technology. Verbal consent was obtained by the patient and accompanying visitor(s) for the recording of patient appointment to facilitate this note. I attest to having reviewed and edited the generated note for accuracy, though some syntax or spelling errors may persist. Please contact the author of this note for any clarification.            [1]   Current Outpatient Medications on File Prior to Visit   Medication Sig Dispense Refill    fluticasone propionate (FLONASE) 50 mcg/actuation nasal spray 1 spray by Each Nostril route once daily.      multivitamin (THERAGRAN) per tablet Take 1 tablet by mouth once daily.       No current facility-administered medications on file prior to visit.   [2]   Social History  Socioeconomic History    Marital status:    Tobacco Use    Smoking status: Former     Types: Cigarettes     Start date: 2008     Quit date:      Years since quittin.3    Smokeless tobacco: Former     Types: Chew   Substance and Sexual Activity    Alcohol use: Yes      Alcohol/week: 12.0 standard drinks of alcohol     Types: 12 Cans of beer per week     Comment: couple beers daily when home    Drug use: Never    Sexual activity: Yes     Partners: Female     Birth control/protection: See Surgical Hx     Social Drivers of Health     Financial Resource Strain: Low Risk  (11/16/2023)    Overall Financial Resource Strain (CARDIA)     Difficulty of Paying Living Expenses: Not hard at all   Food Insecurity: No Food Insecurity (11/16/2023)    Hunger Vital Sign     Worried About Running Out of Food in the Last Year: Never true     Ran Out of Food in the Last Year: Never true   Transportation Needs: No Transportation Needs (11/16/2023)    PRAPARE - Transportation     Lack of Transportation (Medical): No     Lack of Transportation (Non-Medical): No   Physical Activity: Unknown (11/16/2023)    Exercise Vital Sign     Days of Exercise per Week: 0 days   Stress: No Stress Concern Present (11/16/2023)    [employer] - Occupational Stress Questionnaire     Feeling of Stress : Not at all   Housing Stability: Low Risk  (11/16/2023)    Housing Stability Vital Sign     Unable to Pay for Housing in the Last Year: No     Number of Places Lived in the Last Year: 1     Unstable Housing in the Last Year: No

## 2025-05-15 NOTE — ASSESSMENT & PLAN NOTE
- Explained triglycerides as part of lipid panel and their variability based on recent food intake.  - Discussed ways to reduce triglycerides including: decrease fried and processed foods, use healthier cooking oils like olive or avocado oil, choose real butter over margarine, and opt for baked or broiled foods instead of fried.

## 2025-07-22 ENCOUNTER — DOCUMENTATION ONLY (OUTPATIENT)
Dept: FAMILY MEDICINE | Facility: CLINIC | Age: 45
End: 2025-07-22
Payer: COMMERCIAL

## 2025-07-22 LAB — CRC RECOMMENDATION EXT: NORMAL
